# Patient Record
Sex: FEMALE | Race: BLACK OR AFRICAN AMERICAN | NOT HISPANIC OR LATINO
[De-identification: names, ages, dates, MRNs, and addresses within clinical notes are randomized per-mention and may not be internally consistent; named-entity substitution may affect disease eponyms.]

---

## 2018-01-17 ENCOUNTER — ASOB RESULT (OUTPATIENT)
Age: 28
End: 2018-01-17

## 2018-01-17 ENCOUNTER — APPOINTMENT (OUTPATIENT)
Dept: ANTEPARTUM | Facility: CLINIC | Age: 28
End: 2018-01-17
Payer: COMMERCIAL

## 2018-01-17 PROCEDURE — 76813 OB US NUCHAL MEAS 1 GEST: CPT

## 2018-01-17 PROCEDURE — 36416 COLLJ CAPILLARY BLOOD SPEC: CPT

## 2018-01-17 PROCEDURE — 76801 OB US < 14 WKS SINGLE FETUS: CPT

## 2018-01-26 ENCOUNTER — TRANSCRIPTION ENCOUNTER (OUTPATIENT)
Age: 28
End: 2018-01-26

## 2018-03-08 ENCOUNTER — APPOINTMENT (OUTPATIENT)
Dept: ANTEPARTUM | Facility: HOSPITAL | Age: 28
End: 2018-03-08
Payer: COMMERCIAL

## 2018-03-08 ENCOUNTER — ASOB RESULT (OUTPATIENT)
Age: 28
End: 2018-03-08

## 2018-03-08 PROCEDURE — 76811 OB US DETAILED SNGL FETUS: CPT

## 2018-06-26 ENCOUNTER — ASOB RESULT (OUTPATIENT)
Age: 28
End: 2018-06-26

## 2018-06-26 ENCOUNTER — APPOINTMENT (OUTPATIENT)
Dept: ANTEPARTUM | Facility: CLINIC | Age: 28
End: 2018-06-26
Payer: COMMERCIAL

## 2018-06-26 PROCEDURE — 76816 OB US FOLLOW-UP PER FETUS: CPT

## 2018-06-26 PROCEDURE — 76819 FETAL BIOPHYS PROFIL W/O NST: CPT

## 2018-07-06 ENCOUNTER — TRANSCRIPTION ENCOUNTER (OUTPATIENT)
Age: 28
End: 2018-07-06

## 2018-07-07 ENCOUNTER — RESULT REVIEW (OUTPATIENT)
Age: 28
End: 2018-07-07

## 2018-07-07 ENCOUNTER — INPATIENT (INPATIENT)
Facility: HOSPITAL | Age: 28
LOS: 2 days | Discharge: HOME | End: 2018-07-10
Attending: OBSTETRICS & GYNECOLOGY | Admitting: OBSTETRICS & GYNECOLOGY
Payer: COMMERCIAL

## 2018-07-07 VITALS — OXYGEN SATURATION: 100 % | TEMPERATURE: 98 F | SYSTOLIC BLOOD PRESSURE: 114 MMHG | DIASTOLIC BLOOD PRESSURE: 45 MMHG

## 2018-07-07 DIAGNOSIS — Z3A.00 WEEKS OF GESTATION OF PREGNANCY NOT SPECIFIED: ICD-10-CM

## 2018-07-07 DIAGNOSIS — O42.10 PREMATURE RUPTURE OF MEMBRANES, ONSET OF LABOR MORE THAN 24 HOURS FOLLOWING RUPTURE, UNSPECIFIED WEEKS OF GESTATION: ICD-10-CM

## 2018-07-07 DIAGNOSIS — O26.899 OTHER SPECIFIED PREGNANCY RELATED CONDITIONS, UNSPECIFIED TRIMESTER: ICD-10-CM

## 2018-07-07 LAB
BASOPHILS # BLD AUTO: 0.01 K/UL — SIGNIFICANT CHANGE UP (ref 0–0.2)
BASOPHILS NFR BLD AUTO: 0.1 % — SIGNIFICANT CHANGE UP (ref 0–2)
BLD GP AB SCN SERPL QL: NEGATIVE — SIGNIFICANT CHANGE UP
EOSINOPHIL # BLD AUTO: 0.02 K/UL — SIGNIFICANT CHANGE UP (ref 0–0.5)
EOSINOPHIL NFR BLD AUTO: 0.3 % — SIGNIFICANT CHANGE UP (ref 0–6)
HCT VFR BLD CALC: 33.7 % — LOW (ref 34.5–45)
HGB BLD-MCNC: 10.6 G/DL — LOW (ref 11.5–15.5)
IMM GRANULOCYTES # BLD AUTO: 0.05 # — SIGNIFICANT CHANGE UP
IMM GRANULOCYTES NFR BLD AUTO: 0.6 % — SIGNIFICANT CHANGE UP (ref 0–1.5)
LYMPHOCYTES # BLD AUTO: 2.25 K/UL — SIGNIFICANT CHANGE UP (ref 1–3.3)
LYMPHOCYTES # BLD AUTO: 28.8 % — SIGNIFICANT CHANGE UP (ref 13–44)
MCHC RBC-ENTMCNC: 28.2 PG — SIGNIFICANT CHANGE UP (ref 27–34)
MCHC RBC-ENTMCNC: 31.5 % — LOW (ref 32–36)
MCV RBC AUTO: 89.6 FL — SIGNIFICANT CHANGE UP (ref 80–100)
MONOCYTES # BLD AUTO: 0.64 K/UL — SIGNIFICANT CHANGE UP (ref 0–0.9)
MONOCYTES NFR BLD AUTO: 8.2 % — SIGNIFICANT CHANGE UP (ref 2–14)
NEUTROPHILS # BLD AUTO: 4.85 K/UL — SIGNIFICANT CHANGE UP (ref 1.8–7.4)
NEUTROPHILS NFR BLD AUTO: 62 % — SIGNIFICANT CHANGE UP (ref 43–77)
NRBC # FLD: 0 — SIGNIFICANT CHANGE UP
PLATELET # BLD AUTO: 141 K/UL — LOW (ref 150–400)
PMV BLD: 12.1 FL — SIGNIFICANT CHANGE UP (ref 7–13)
RBC # BLD: 3.76 M/UL — LOW (ref 3.8–5.2)
RBC # FLD: 14.4 % — SIGNIFICANT CHANGE UP (ref 10.3–14.5)
RH IG SCN BLD-IMP: POSITIVE — SIGNIFICANT CHANGE UP
RH IG SCN BLD-IMP: POSITIVE — SIGNIFICANT CHANGE UP
WBC # BLD: 7.82 K/UL — SIGNIFICANT CHANGE UP (ref 3.8–10.5)
WBC # FLD AUTO: 7.82 K/UL — SIGNIFICANT CHANGE UP (ref 3.8–10.5)

## 2018-07-07 PROCEDURE — 88307 TISSUE EXAM BY PATHOLOGIST: CPT | Mod: 26

## 2018-07-07 RX ORDER — OXYCODONE HYDROCHLORIDE 5 MG/1
5 TABLET ORAL
Qty: 0 | Refills: 0 | Status: DISCONTINUED | OUTPATIENT
Start: 2018-07-07 | End: 2018-07-09

## 2018-07-07 RX ORDER — OXYCODONE HYDROCHLORIDE 5 MG/1
5 TABLET ORAL EVERY 4 HOURS
Qty: 0 | Refills: 0 | Status: DISCONTINUED | OUTPATIENT
Start: 2018-07-07 | End: 2018-07-10

## 2018-07-07 RX ORDER — SODIUM CHLORIDE 9 MG/ML
1000 INJECTION, SOLUTION INTRAVENOUS ONCE
Qty: 0 | Refills: 0 | Status: DISCONTINUED | OUTPATIENT
Start: 2018-07-07 | End: 2018-07-07

## 2018-07-07 RX ORDER — IBUPROFEN 200 MG
600 TABLET ORAL EVERY 6 HOURS
Qty: 0 | Refills: 0 | Status: COMPLETED | OUTPATIENT
Start: 2018-07-07 | End: 2019-06-05

## 2018-07-07 RX ORDER — OXYCODONE HYDROCHLORIDE 5 MG/1
10 TABLET ORAL
Qty: 0 | Refills: 0 | Status: DISCONTINUED | OUTPATIENT
Start: 2018-07-07 | End: 2018-07-09

## 2018-07-07 RX ORDER — HEPARIN SODIUM 5000 [USP'U]/ML
5000 INJECTION INTRAVENOUS; SUBCUTANEOUS EVERY 12 HOURS
Qty: 0 | Refills: 0 | Status: DISCONTINUED | OUTPATIENT
Start: 2018-07-07 | End: 2018-07-10

## 2018-07-07 RX ORDER — SODIUM CHLORIDE 9 MG/ML
1000 INJECTION, SOLUTION INTRAVENOUS
Qty: 0 | Refills: 0 | Status: DISCONTINUED | OUTPATIENT
Start: 2018-07-07 | End: 2018-07-09

## 2018-07-07 RX ORDER — GLYCERIN ADULT
1 SUPPOSITORY, RECTAL RECTAL AT BEDTIME
Qty: 0 | Refills: 0 | Status: DISCONTINUED | OUTPATIENT
Start: 2018-07-07 | End: 2018-07-10

## 2018-07-07 RX ORDER — FAMOTIDINE 10 MG/ML
20 INJECTION INTRAVENOUS ONCE
Qty: 0 | Refills: 0 | Status: DISCONTINUED | OUTPATIENT
Start: 2018-07-07 | End: 2018-07-07

## 2018-07-07 RX ORDER — ONDANSETRON 8 MG/1
4 TABLET, FILM COATED ORAL EVERY 6 HOURS
Qty: 0 | Refills: 0 | Status: DISCONTINUED | OUTPATIENT
Start: 2018-07-07 | End: 2018-07-09

## 2018-07-07 RX ORDER — DOCUSATE SODIUM 100 MG
100 CAPSULE ORAL
Qty: 0 | Refills: 0 | Status: DISCONTINUED | OUTPATIENT
Start: 2018-07-07 | End: 2018-07-10

## 2018-07-07 RX ORDER — SODIUM CHLORIDE 9 MG/ML
1000 INJECTION, SOLUTION INTRAVENOUS
Qty: 0 | Refills: 0 | Status: DISCONTINUED | OUTPATIENT
Start: 2018-07-07 | End: 2018-07-07

## 2018-07-07 RX ORDER — NALOXONE HYDROCHLORIDE 4 MG/.1ML
0.1 SPRAY NASAL
Qty: 0 | Refills: 0 | Status: DISCONTINUED | OUTPATIENT
Start: 2018-07-07 | End: 2018-07-09

## 2018-07-07 RX ORDER — METOCLOPRAMIDE HCL 10 MG
10 TABLET ORAL ONCE
Qty: 0 | Refills: 0 | Status: DISCONTINUED | OUTPATIENT
Start: 2018-07-07 | End: 2018-07-07

## 2018-07-07 RX ORDER — KETOROLAC TROMETHAMINE 30 MG/ML
30 SYRINGE (ML) INJECTION EVERY 6 HOURS
Qty: 0 | Refills: 0 | Status: DISCONTINUED | OUTPATIENT
Start: 2018-07-07 | End: 2018-07-08

## 2018-07-07 RX ORDER — FERROUS SULFATE 325(65) MG
325 TABLET ORAL DAILY
Qty: 0 | Refills: 0 | Status: DISCONTINUED | OUTPATIENT
Start: 2018-07-07 | End: 2018-07-10

## 2018-07-07 RX ORDER — TETANUS TOXOID, REDUCED DIPHTHERIA TOXOID AND ACELLULAR PERTUSSIS VACCINE, ADSORBED 5; 2.5; 8; 8; 2.5 [IU]/.5ML; [IU]/.5ML; UG/.5ML; UG/.5ML; UG/.5ML
0.5 SUSPENSION INTRAMUSCULAR ONCE
Qty: 0 | Refills: 0 | Status: DISCONTINUED | OUTPATIENT
Start: 2018-07-07 | End: 2018-07-10

## 2018-07-07 RX ORDER — LANOLIN
1 OINTMENT (GRAM) TOPICAL
Qty: 0 | Refills: 0 | Status: DISCONTINUED | OUTPATIENT
Start: 2018-07-07 | End: 2018-07-10

## 2018-07-07 RX ORDER — DIPHENHYDRAMINE HCL 50 MG
25 CAPSULE ORAL EVERY 6 HOURS
Qty: 0 | Refills: 0 | Status: DISCONTINUED | OUTPATIENT
Start: 2018-07-07 | End: 2018-07-10

## 2018-07-07 RX ORDER — HYDROMORPHONE HYDROCHLORIDE 2 MG/ML
1 INJECTION INTRAMUSCULAR; INTRAVENOUS; SUBCUTANEOUS
Qty: 0 | Refills: 0 | Status: DISCONTINUED | OUTPATIENT
Start: 2018-07-07 | End: 2018-07-09

## 2018-07-07 RX ORDER — OXYCODONE HYDROCHLORIDE 5 MG/1
5 TABLET ORAL
Qty: 0 | Refills: 0 | Status: DISCONTINUED | OUTPATIENT
Start: 2018-07-07 | End: 2018-07-10

## 2018-07-07 RX ORDER — CITRIC ACID/SODIUM CITRATE 300-500 MG
30 SOLUTION, ORAL ORAL ONCE
Qty: 0 | Refills: 0 | Status: DISCONTINUED | OUTPATIENT
Start: 2018-07-07 | End: 2018-07-07

## 2018-07-07 RX ORDER — OXYTOCIN 10 UNIT/ML
333.33 VIAL (ML) INJECTION
Qty: 20 | Refills: 0 | Status: DISCONTINUED | OUTPATIENT
Start: 2018-07-07 | End: 2018-07-07

## 2018-07-07 RX ORDER — SIMETHICONE 80 MG/1
80 TABLET, CHEWABLE ORAL EVERY 4 HOURS
Qty: 0 | Refills: 0 | Status: DISCONTINUED | OUTPATIENT
Start: 2018-07-07 | End: 2018-07-10

## 2018-07-07 RX ORDER — OXYTOCIN 10 UNIT/ML
41.67 VIAL (ML) INJECTION
Qty: 20 | Refills: 0 | Status: DISCONTINUED | OUTPATIENT
Start: 2018-07-07 | End: 2018-07-09

## 2018-07-07 RX ORDER — ACETAMINOPHEN 500 MG
975 TABLET ORAL EVERY 6 HOURS
Qty: 0 | Refills: 0 | Status: DISCONTINUED | OUTPATIENT
Start: 2018-07-07 | End: 2018-07-10

## 2018-07-07 RX ADMIN — FAMOTIDINE 20 MILLIGRAM(S): 10 INJECTION INTRAVENOUS at 21:47

## 2018-07-07 RX ADMIN — SODIUM CHLORIDE 1000 MILLILITER(S): 9 INJECTION, SOLUTION INTRAVENOUS at 23:05

## 2018-07-07 RX ADMIN — Medication 125 MILLIUNIT(S)/MIN: at 23:42

## 2018-07-07 RX ADMIN — SODIUM CHLORIDE 125 MILLILITER(S): 9 INJECTION, SOLUTION INTRAVENOUS at 10:14

## 2018-07-07 RX ADMIN — Medication 10 MILLIGRAM(S): at 21:47

## 2018-07-08 ENCOUNTER — TRANSCRIPTION ENCOUNTER (OUTPATIENT)
Age: 28
End: 2018-07-08

## 2018-07-08 LAB
HCT VFR BLD CALC: 31 % — LOW (ref 34.5–45)
HGB BLD-MCNC: 9.8 G/DL — LOW (ref 11.5–15.5)
MCHC RBC-ENTMCNC: 27.7 PG — SIGNIFICANT CHANGE UP (ref 27–34)
MCHC RBC-ENTMCNC: 31.6 % — LOW (ref 32–36)
MCV RBC AUTO: 87.6 FL — SIGNIFICANT CHANGE UP (ref 80–100)
NRBC # FLD: 0 — SIGNIFICANT CHANGE UP
PLATELET # BLD AUTO: 163 K/UL — SIGNIFICANT CHANGE UP (ref 150–400)
PMV BLD: 11.6 FL — SIGNIFICANT CHANGE UP (ref 7–13)
RBC # BLD: 3.54 M/UL — LOW (ref 3.8–5.2)
RBC # FLD: 14.5 % — SIGNIFICANT CHANGE UP (ref 10.3–14.5)
T PALLIDUM AB TITR SER: NEGATIVE — SIGNIFICANT CHANGE UP
WBC # BLD: 11.86 K/UL — HIGH (ref 3.8–10.5)
WBC # FLD AUTO: 11.86 K/UL — HIGH (ref 3.8–10.5)

## 2018-07-08 RX ORDER — SODIUM CHLORIDE 9 MG/ML
1000 INJECTION, SOLUTION INTRAVENOUS ONCE
Qty: 0 | Refills: 0 | Status: COMPLETED | OUTPATIENT
Start: 2018-07-08 | End: 2018-07-07

## 2018-07-08 RX ORDER — IBUPROFEN 200 MG
600 TABLET ORAL EVERY 6 HOURS
Qty: 0 | Refills: 0 | Status: DISCONTINUED | OUTPATIENT
Start: 2018-07-08 | End: 2018-07-10

## 2018-07-08 RX ORDER — SODIUM CHLORIDE 9 MG/ML
1000 INJECTION, SOLUTION INTRAVENOUS
Qty: 0 | Refills: 0 | Status: DISCONTINUED | OUTPATIENT
Start: 2018-07-08 | End: 2018-07-10

## 2018-07-08 RX ORDER — SODIUM CHLORIDE 9 MG/ML
500 INJECTION, SOLUTION INTRAVENOUS ONCE
Qty: 0 | Refills: 0 | Status: DISCONTINUED | OUTPATIENT
Start: 2018-07-08 | End: 2018-07-10

## 2018-07-08 RX ADMIN — SODIUM CHLORIDE 75 MILLILITER(S): 9 INJECTION, SOLUTION INTRAVENOUS at 01:06

## 2018-07-08 RX ADMIN — Medication 30 MILLIGRAM(S): at 18:56

## 2018-07-08 RX ADMIN — Medication 30 MILLIGRAM(S): at 18:26

## 2018-07-08 RX ADMIN — HEPARIN SODIUM 5000 UNIT(S): 5000 INJECTION INTRAVENOUS; SUBCUTANEOUS at 05:28

## 2018-07-08 RX ADMIN — Medication 1 TABLET(S): at 13:33

## 2018-07-08 RX ADMIN — Medication 25 MILLIGRAM(S): at 07:34

## 2018-07-08 RX ADMIN — Medication 325 MILLIGRAM(S): at 13:33

## 2018-07-08 RX ADMIN — HEPARIN SODIUM 5000 UNIT(S): 5000 INJECTION INTRAVENOUS; SUBCUTANEOUS at 18:26

## 2018-07-08 RX ADMIN — Medication 30 MILLIGRAM(S): at 05:28

## 2018-07-08 NOTE — PROGRESS NOTE ADULT - ASSESSMENT
A/P: 28yo POD#1 s/p LTCS.  Patient is stable and doing well post-operatively.    - Continue regular diet as tolerated.  -Sullivan and IV for D/C at 24h  - Ambulation upon D/C of sullivan and IV  - Continue motrin, tylenol, oxycodone PRN for pain control.   - F/u AM CBC    Roxi Rome PGY-1

## 2018-07-08 NOTE — DISCHARGE NOTE OB - MEDICATION SUMMARY - MEDICATIONS TO TAKE
I will START or STAY ON the medications listed below when I get home from the hospital:    ibuprofen 600 mg oral tablet  -- 1 tab(s) by mouth every 6 hours  -- Indication: For Term birth of male     acetaminophen 325 mg oral tablet  -- 3 tab(s) by mouth every 6 hours  -- Indication: For Term birth of male  I will START or STAY ON the medications listed below when I get home from the hospital:    ibuprofen 600 mg oral tablet  -- 1 tab(s) by mouth every 6 hours  -- Indication: For moderate pain    acetaminophen 325 mg oral tablet  -- 3 tab(s) by mouth every 6 hours  -- Indication: For moderate pain

## 2018-07-08 NOTE — PROGRESS NOTE ADULT - SUBJECTIVE AND OBJECTIVE BOX
Patient offers no complaints. Denies shortness of breath, chest pain, palpitations, nausea. Patient had one episode of emesis overnight. Tolerating applesauce. Breast feeding. Lochia moderate.  VS 97.8  P 75  /71 I/O 4600/1215 shift    Heent nl  Abd soft dressing C/DI  Ext b/l Venodynes on  Labs 9.8/31

## 2018-07-08 NOTE — PROGRESS NOTE ADULT - ASSESSMENT
A/P: POD #1 s/p PLTCS, doing well, UOP increasing, hgb decrease appropriate post op  Continue to monitor UOP with sullivan in place  Encouraged increased clear fluid intake

## 2018-07-08 NOTE — PROGRESS NOTE ADULT - SUBJECTIVE AND OBJECTIVE BOX
OB Progress Note:  Delivery, POD#1    S: 26yo POD#1 s/p LTCS . Her pain is well controlled. She has not yet eaten and has not passed flatus. Denies N/V. Denies CP/SOB/lightheadedness/dizziness.   Lozano and IV in place.    O:   Vital Signs Last 24 Hrs  T(C): 36.6 (2018 05:42), Max: 36.8 (2018 02:20)  T(F): 97.8 (2018 05:42), Max: 98.2 (2018 02:20)  HR: 75 (2018 05:42) (75 - 94)  BP: 123/71 (2018 05:42) (114/45 - 139/88)  BP(mean): 73 (2018 01:00) (62 - 103)  RR: 19 (2018 05:42) (15 - 24)  SpO2: 97% (2018 05:42) (97% - 100%)    Labs:  Blood type: A Positive  Rubella IgG: RPR: Negative                          10.6<L>   7.82 >-----------< 141<L>    (  @ 09:30 )             33.7<L>      PE:  General: NAD  Abdomen: Mildly distended, appropriately tender, incision c/d/i.  Extremities: No erythema, no pitting edema  Pelvic: Moderate lochia

## 2018-07-08 NOTE — LACTATION INITIAL EVALUATION - INTERVENTION OUTCOME
continue  to  assess.   spoon  fed  several  drops  of   colostrum  ./needs not met/verbalizes understanding verbalizes understanding/continue  to  assess. rn aware.  spoon  fed  several  drops  of   colostrum  ./needs not met

## 2018-07-08 NOTE — DISCHARGE NOTE OB - CARE PROVIDER_API CALL
Kohanim, Behnam (MD), Obstetrics and Gynecology  [address] [facility]  [address] [place] [address]  Phone: [phone]  Fax: [phone]

## 2018-07-08 NOTE — DISCHARGE NOTE OB - CARE PLAN
Principal Discharge DX:	Term birth of male   Goal:	full recovery  Assessment and plan of treatment:	no driving x 2wks  No heavy lifting x 3months  Pelvic rest x 6wks  Secondary Diagnosis:	Full-term premature rupture of membranes, unspecified duration to onset of labor

## 2018-07-08 NOTE — LACTATION INITIAL EVALUATION - LACTATION INTERVENTIONS
initiate skin to skin/nbn  not  latching   after  several  attempts.  nbn  also   sleepy  reviewed   safe  positioning  with  nose  in  sniffing  position  .  if  nbn  not  breastfeeding  effectively  hand  express  and  pump  and   give  teaspoons  between  feedings alternative  feeding   method.   encouraged  to  ask  for  assistance   .  nbn  less  than  20  hours  old  .  reviewed  hand  expression  and  pumping   frequency  and  care  of  pump/initiate hand expression routine/initiate dual electric pump routine/reverse pressure softening

## 2018-07-08 NOTE — PROGRESS NOTE ADULT - SUBJECTIVE AND OBJECTIVE BOX
Postop Day  __1_ s/p   C- Section     THERAPY:  [  ] Spinal morphine   [ x ] Epidural morphine   [  ] IV PCA Hydromorphone 1 mg/ml      Sedation Score:	  [ x ] Alert	    [  ] Drowsy        [  ] Arousable	[  ] Asleep	[  ] Unresponsive    Side Effects:	  [ x ] None	     [  ] Nausea        [  ] Pruritus        [  ] Weakness   [  ] Numbness        ASSESSMENT/ PLAN   [   ] Discontinue         [  ] Continue  [ x ]Documentation and Verification of current medications     Comments:

## 2018-07-08 NOTE — DISCHARGE NOTE OB - MATERIALS PROVIDED
Back To Sleep Handout/MMR Vaccination (VIS Pub Date: 2012)/Tdap Vaccination (VIS Pub Date: 2012)/  Immunization Record/[facility]  Screening Program/[facility] Hearing Screen Program/Guide to Postpartum Care

## 2018-07-08 NOTE — PROGRESS NOTE ADULT - ASSESSMENT
A: POD#1 s/p Primary cs for category II tracing, decreased urine output s/p fluid bolus  P: Monitor I/O's and Vitals     Encourage ambulation      Routine postop care

## 2018-07-08 NOTE — DISCHARGE NOTE OB - MEDICATION SUMMARY - MEDICATIONS TO STOP TAKING
I will STOP taking the medications listed below when I get home from the hospital:     mg oral tablet  -- 1 tab(s) by mouth every 8 hours as needed for pain with food  -- Do not take this drug if you are pregnant.  It is very important that you take or use this exactly as directed.  Do not skip doses or discontinue unless directed by your doctor.  May cause drowsiness or dizziness.  Obtain medical advice before taking any non-prescription drugs as some may affect the action of this medication.  Take with food or milk. I will STOP taking the medications listed below when I get home from the hospital:     mg oral tablet  -- 1 tab(s) by mouth every 8 hours as needed for pain with food  -- Do not take this drug if you are pregnant.  It is very important that you take or use this exactly as directed.  Do not skip doses or discontinue unless directed by your doctor.  May cause drowsiness or dizziness.  Obtain medical advice before taking any non-prescription drugs as some may affect the action of this medication.  Take with food or milk.    Valium 5 mg oral tablet  -- 1 tab(s) by mouth every 8 hours as needed for muscle spasm  -- Caution federal law prohibits the transfer of this drug to any person other  than the person for whom it was prescribed.  Do not take this drug if you are pregnant.  May cause drowsiness.  Alcohol may intensify this effect.  Use care when operating dangerous machinery.

## 2018-07-08 NOTE — DISCHARGE NOTE OB - PATIENT PORTAL LINK FT
You can access the [facility] Patient Portal, offered by [facility], by registering with the following website: [email or web address]

## 2018-07-08 NOTE — PROGRESS NOTE ADULT - SUBJECTIVE AND OBJECTIVE BOX
OB/GYN House Attending      Patient feeling well, pain controlled, marlys gen diet, drank two pitchers of fluid  No dizziness  RN noted UOP 85 ml/3 hours this morning, plan was for 500ml bolus at noon if it had not increased    She is normotensive with HR in the 70s-90s, afebrile  Gen: Alert, awake, NAD  CV: RRR  Lungs: Clear  Abd: soft, min distension, +bowel sounds, minimal tenderness to palpation of abdomen and uterus  Ext: 2+ edema, non tender, symmetric    Lozano catheter in place, average urine output now about 40ml/hour                          9.8    11.86 )-----------( 163      ( 08 Jul 2018 08:25 )             31.0     Prior Hgb was 10.6

## 2018-07-08 NOTE — PROVIDER CONTACT NOTE (OTHER) - ACTION/TREATMENT ORDERED:
Dr. Quiroz in to see pt. Per Dr. Quiroz if continues to have decreased UOP give 500cc bolus of LR at 12p

## 2018-07-08 NOTE — PROVIDER CONTACT NOTE (OTHER) - BACKGROUND
primary C/S 7/7 2219, with decrease UOP since last night. 500cc IVF bolus given as ordered H/H 9.8/31 from this am.

## 2018-07-09 RX ORDER — IBUPROFEN 200 MG
1 TABLET ORAL
Qty: 0 | Refills: 0 | DISCHARGE
Start: 2018-07-09

## 2018-07-09 RX ORDER — ACETAMINOPHEN 500 MG
3 TABLET ORAL
Qty: 0 | Refills: 0 | DISCHARGE
Start: 2018-07-09

## 2018-07-09 RX ADMIN — HEPARIN SODIUM 5000 UNIT(S): 5000 INJECTION INTRAVENOUS; SUBCUTANEOUS at 06:31

## 2018-07-09 RX ADMIN — Medication 600 MILLIGRAM(S): at 18:30

## 2018-07-09 RX ADMIN — Medication 600 MILLIGRAM(S): at 12:15

## 2018-07-09 RX ADMIN — Medication 600 MILLIGRAM(S): at 17:48

## 2018-07-09 RX ADMIN — Medication 975 MILLIGRAM(S): at 17:47

## 2018-07-09 RX ADMIN — Medication 325 MILLIGRAM(S): at 11:27

## 2018-07-09 RX ADMIN — Medication 600 MILLIGRAM(S): at 03:48

## 2018-07-09 RX ADMIN — Medication 975 MILLIGRAM(S): at 11:28

## 2018-07-09 RX ADMIN — Medication 1 TABLET(S): at 11:27

## 2018-07-09 RX ADMIN — HEPARIN SODIUM 5000 UNIT(S): 5000 INJECTION INTRAVENOUS; SUBCUTANEOUS at 17:10

## 2018-07-09 RX ADMIN — Medication 975 MILLIGRAM(S): at 12:15

## 2018-07-09 RX ADMIN — Medication 600 MILLIGRAM(S): at 11:27

## 2018-07-09 RX ADMIN — Medication 600 MILLIGRAM(S): at 02:37

## 2018-07-09 RX ADMIN — Medication 975 MILLIGRAM(S): at 18:30

## 2018-07-09 NOTE — PROGRESS NOTE ADULT - SUBJECTIVE AND OBJECTIVE BOX
Postpartum Note,  Section  She is a  27y woman who is now post-operative day: 2    Subjective:  The patient feels well.  She is ambulating.   She is tolerating regular diet.  She denies nausea and vomiting.  She is voiding.  Her pain is controlled.  She reports normal postpartum bleeding.    Physical exam:  Vital Signs Last 24 Hrs  T(C): 37.1 (2018 17:31), Max: 37.1 (2018 06:28)  T(F): 98.8 (2018 17:31), Max: 98.8 (2018 17:31)  HR: 78 (2018 17:31) (75 - 90)  BP: 139/74 (2018 17:31) (129/71 - 139/74)  BP(mean): --  RR: 18 (2018 17:31) (17 - 18)  SpO2: 100% (2018 17:31) (97% - 100%)  Gen: NAD      LABS:                        9.8    11.86 )-----------( 163      ( 2018 08:25 )             31.0       Allergies    No Known Allergies    Intolerances      MEDICATIONS  (STANDING):  acetaminophen   Tablet. 975 milliGRAM(s) Oral every 6 hours  diphtheria/tetanus/pertussis (acellular) Vaccine (ADAcel) 0.5 milliLiter(s) IntraMuscular once  ferrous    sulfate 325 milliGRAM(s) Oral daily  heparin  Injectable 5000 Unit(s) SubCutaneous every 12 hours  ibuprofen  Tablet 600 milliGRAM(s) Oral every 6 hours  lactated ringers Bolus 500 milliLiter(s) IV Bolus once  lactated ringers. 1000 milliLiter(s) (75 mL/Hr) IV Continuous <Continuous>  oxyCODONE    IR 5 milliGRAM(s) Oral every 3 hours  prenatal multivitamin 1 Tablet(s) Oral daily    MEDICATIONS  (PRN):  diphenhydrAMINE   Capsule 25 milliGRAM(s) Oral every 6 hours PRN Itching  docusate sodium 100 milliGRAM(s) Oral two times a day PRN Stool Softening  glycerin Suppository - Adult 1 Suppository(s) Rectal at bedtime PRN Constipation  lanolin Ointment 1 Application(s) Topical every 3 hours PRN Sore Nipples  oxyCODONE    IR 5 milliGRAM(s) Oral every 4 hours PRN Severe Pain (7 - 10)  simethicone 80 milliGRAM(s) Chew every 4 hours PRN Gas      Assessment and Plan  POD #2 s/p  section  Doing well.  Encourage ambulation.  DC HOME IN AM

## 2018-07-10 VITALS
SYSTOLIC BLOOD PRESSURE: 134 MMHG | RESPIRATION RATE: 18 BRPM | DIASTOLIC BLOOD PRESSURE: 66 MMHG | TEMPERATURE: 97 F | OXYGEN SATURATION: 100 % | HEART RATE: 71 BPM

## 2018-07-10 RX ADMIN — Medication 600 MILLIGRAM(S): at 06:51

## 2018-07-10 RX ADMIN — Medication 975 MILLIGRAM(S): at 06:51

## 2018-07-10 RX ADMIN — Medication 975 MILLIGRAM(S): at 00:34

## 2018-07-10 RX ADMIN — Medication 600 MILLIGRAM(S): at 01:10

## 2018-07-10 RX ADMIN — Medication 975 MILLIGRAM(S): at 12:23

## 2018-07-10 RX ADMIN — Medication 600 MILLIGRAM(S): at 00:34

## 2018-07-10 RX ADMIN — Medication 600 MILLIGRAM(S): at 12:45

## 2018-07-10 RX ADMIN — Medication 975 MILLIGRAM(S): at 12:44

## 2018-07-10 RX ADMIN — Medication 325 MILLIGRAM(S): at 12:45

## 2018-07-10 RX ADMIN — Medication 975 MILLIGRAM(S): at 01:10

## 2018-07-10 RX ADMIN — Medication 100 MILLIGRAM(S): at 06:51

## 2018-07-10 RX ADMIN — Medication 975 MILLIGRAM(S): at 07:30

## 2018-07-10 RX ADMIN — Medication 600 MILLIGRAM(S): at 12:24

## 2018-07-10 RX ADMIN — HEPARIN SODIUM 5000 UNIT(S): 5000 INJECTION INTRAVENOUS; SUBCUTANEOUS at 06:51

## 2018-07-10 RX ADMIN — Medication 600 MILLIGRAM(S): at 07:30

## 2018-07-10 RX ADMIN — Medication 1 TABLET(S): at 12:45

## 2018-07-10 NOTE — PROGRESS NOTE ADULT - SUBJECTIVE AND OBJECTIVE BOX
SUBJECTIVE:    Pain: Controlled    Complaints: None    MILESTONES:    Alert and Oriented x 3  [ x ]  Out of bed/ ambulating. [ x ]  Flatus:   Positive [ x ]  Negative [  ]  Bowel movement  [  ] Positive [  ] Negative   Voiding [x  ] Due to void [  ]   Lozano/Indwelling catheter in place [  ]  Diet: Regular [ x ]  Clears [  ]  NPO [  ]    Infant feeding:  Breast [  ]   Bottle [  ]  Both [ X ]  Feeding related issues and/or concerns:      OBJECTIVE:  T(C): 36.3 (07-10-18 @ 05:04), Max: 37.1 (18 @ 17:31)  HR: 71 (07-10-18 @ 05:04) (71 - 78)  BP: 134/66 (07-10-18 @ 05:04) (127/67 - 139/74)  RR: 18 (07-10-18 @ 05:04) (17 - 18)  SpO2: 100% (07-10-18 @ 05:04) (100% - 100%)  Wt(kg): --          Blood Type: A Positive    RPR: Negative          MEDICATIONS  (STANDING):  acetaminophen   Tablet. 975 milliGRAM(s) Oral every 6 hours  diphtheria/tetanus/pertussis (acellular) Vaccine (ADAcel) 0.5 milliLiter(s) IntraMuscular once  ferrous    sulfate 325 milliGRAM(s) Oral daily  heparin  Injectable 5000 Unit(s) SubCutaneous every 12 hours  ibuprofen  Tablet 600 milliGRAM(s) Oral every 6 hours  lactated ringers Bolus 500 milliLiter(s) IV Bolus once  oxyCODONE    IR 5 milliGRAM(s) Oral every 3 hours  prenatal multivitamin 1 Tablet(s) Oral daily    MEDICATIONS  (PRN):  diphenhydrAMINE   Capsule 25 milliGRAM(s) Oral every 6 hours PRN Itching  docusate sodium 100 milliGRAM(s) Oral two times a day PRN Stool Softening  glycerin Suppository - Adult 1 Suppository(s) Rectal at bedtime PRN Constipation  lanolin Ointment 1 Application(s) Topical every 3 hours PRN Sore Nipples  oxyCODONE    IR 5 milliGRAM(s) Oral every 4 hours PRN Severe Pain (7 - 10)  simethicone 80 milliGRAM(s) Chew every 4 hours PRN Gas        ASSESSMENT:    27y     G   1   P  1001       PO Day#  3        Delivery: Primary [ X ]    Repeat [  ]                                         Indication of procedure: Abnormal Fetal Status    Condition: Stable    Past Medical History significant for: HPI:      Current Issues:    Breasts:  Soft [x  ]   Engorged [  ]  Nipples:  Abdomen: Soft [ x ]   Distended [  ] Nontender [  ]     Bowel sounds :  Present [  ]  Absent [  ]   Fundus:  Firm [x  ]  Boggy [  ]    Abdominal incision: Clean, Dry and Intact [x  ]  Staples [  ] Steri Strips [ X ] Dermabond [  ] Sutures [  ]   Dressing Removed today.    Patient wearing abdominal binder for support.    Vaginal: Lochia:  Heavy [  ]  Moderate [ x ]   Scant [  ]    Extremities: Edema [  ] Negative Patricia's Sign [  ] Nontender Sammy  [ x ] Positive pedal pulses [  ]    Other relevant physical exam findings:      PLAN:    Plan: Increase ambulation, analgesia PRN and pain medication protocol standing oxycodone, ibuprofen and acetaminophen.    Diet: Regular diet    Continue routine post-operative and postpartum care. Dressing Removed    Discharge Planning [ x ]    For discharge Today  [ X   ]    Consults:  Social Work [  ]  Lactation [ x ]  Other [         ]

## 2018-07-20 LAB — SURGICAL PATHOLOGY STUDY: SIGNIFICANT CHANGE UP

## 2019-07-31 ENCOUNTER — RESULT REVIEW (OUTPATIENT)
Age: 29
End: 2019-07-31

## 2020-04-26 ENCOUNTER — EMERGENCY (EMERGENCY)
Facility: HOSPITAL | Age: 30
LOS: 1 days | Discharge: HOME | End: 2020-04-26
Attending: EMERGENCY MEDICINE | Admitting: EMERGENCY MEDICINE
Payer: COMMERCIAL

## 2020-04-26 VITALS
RESPIRATION RATE: 16 BRPM | TEMPERATURE: 99 F | HEART RATE: 111 BPM | OXYGEN SATURATION: 100 % | SYSTOLIC BLOOD PRESSURE: 119 MMHG | DIASTOLIC BLOOD PRESSURE: 79 MMHG

## 2020-04-26 VITALS
DIASTOLIC BLOOD PRESSURE: 56 MMHG | TEMPERATURE: 98 F | OXYGEN SATURATION: 100 % | RESPIRATION RATE: 16 BRPM | SYSTOLIC BLOOD PRESSURE: 129 MMHG | HEART RATE: 82 BPM

## 2020-04-26 LAB
ALBUMIN SERPL ELPH-MCNC: 3.9 G/DL — SIGNIFICANT CHANGE UP (ref 3.3–5)
ALP SERPL-CCNC: 57 U/L — SIGNIFICANT CHANGE UP (ref 40–120)
ALT FLD-CCNC: 13 U/L — SIGNIFICANT CHANGE UP (ref 4–33)
ANION GAP SERPL CALC-SCNC: 13 MMO/L — SIGNIFICANT CHANGE UP (ref 7–14)
APTT BLD: 26.2 SEC — LOW (ref 27.5–36.3)
AST SERPL-CCNC: 13 U/L — SIGNIFICANT CHANGE UP (ref 4–32)
BASOPHILS # BLD AUTO: 0.03 K/UL — SIGNIFICANT CHANGE UP (ref 0–0.2)
BASOPHILS NFR BLD AUTO: 0.3 % — SIGNIFICANT CHANGE UP (ref 0–2)
BILIRUB SERPL-MCNC: < 0.2 MG/DL — LOW (ref 0.2–1.2)
BLD GP AB SCN SERPL QL: NEGATIVE — SIGNIFICANT CHANGE UP
BUN SERPL-MCNC: 12 MG/DL — SIGNIFICANT CHANGE UP (ref 7–23)
CALCIUM SERPL-MCNC: 8.8 MG/DL — SIGNIFICANT CHANGE UP (ref 8.4–10.5)
CHLORIDE SERPL-SCNC: 103 MMOL/L — SIGNIFICANT CHANGE UP (ref 98–107)
CO2 SERPL-SCNC: 25 MMOL/L — SIGNIFICANT CHANGE UP (ref 22–31)
CREAT SERPL-MCNC: 0.64 MG/DL — SIGNIFICANT CHANGE UP (ref 0.5–1.3)
EOSINOPHIL # BLD AUTO: 0.04 K/UL — SIGNIFICANT CHANGE UP (ref 0–0.5)
EOSINOPHIL NFR BLD AUTO: 0.4 % — SIGNIFICANT CHANGE UP (ref 0–6)
GLUCOSE SERPL-MCNC: 122 MG/DL — HIGH (ref 70–99)
HCG SERPL-ACNC: 2624 MIU/ML — SIGNIFICANT CHANGE UP
HCT VFR BLD CALC: 28.1 % — LOW (ref 34.5–45)
HGB BLD-MCNC: 9 G/DL — LOW (ref 11.5–15.5)
IMM GRANULOCYTES NFR BLD AUTO: 0.4 % — SIGNIFICANT CHANGE UP (ref 0–1.5)
INR BLD: 1.03 — SIGNIFICANT CHANGE UP (ref 0.88–1.17)
LYMPHOCYTES # BLD AUTO: 2.24 K/UL — SIGNIFICANT CHANGE UP (ref 1–3.3)
LYMPHOCYTES # BLD AUTO: 23.9 % — SIGNIFICANT CHANGE UP (ref 13–44)
MCHC RBC-ENTMCNC: 27 PG — SIGNIFICANT CHANGE UP (ref 27–34)
MCHC RBC-ENTMCNC: 32 % — SIGNIFICANT CHANGE UP (ref 32–36)
MCV RBC AUTO: 84.4 FL — SIGNIFICANT CHANGE UP (ref 80–100)
MONOCYTES # BLD AUTO: 0.46 K/UL — SIGNIFICANT CHANGE UP (ref 0–0.9)
MONOCYTES NFR BLD AUTO: 4.9 % — SIGNIFICANT CHANGE UP (ref 2–14)
NEUTROPHILS # BLD AUTO: 6.57 K/UL — SIGNIFICANT CHANGE UP (ref 1.8–7.4)
NEUTROPHILS NFR BLD AUTO: 70.1 % — SIGNIFICANT CHANGE UP (ref 43–77)
NRBC # FLD: 0 K/UL — SIGNIFICANT CHANGE UP (ref 0–0)
PLATELET # BLD AUTO: 266 K/UL — SIGNIFICANT CHANGE UP (ref 150–400)
PMV BLD: 9.9 FL — SIGNIFICANT CHANGE UP (ref 7–13)
POTASSIUM SERPL-MCNC: 3.9 MMOL/L — SIGNIFICANT CHANGE UP (ref 3.5–5.3)
POTASSIUM SERPL-SCNC: 3.9 MMOL/L — SIGNIFICANT CHANGE UP (ref 3.5–5.3)
PROT SERPL-MCNC: 7.1 G/DL — SIGNIFICANT CHANGE UP (ref 6–8.3)
PROTHROM AB SERPL-ACNC: 11.8 SEC — SIGNIFICANT CHANGE UP (ref 9.8–13.1)
RBC # BLD: 3.33 M/UL — LOW (ref 3.8–5.2)
RBC # FLD: 13.9 % — SIGNIFICANT CHANGE UP (ref 10.3–14.5)
RH IG SCN BLD-IMP: POSITIVE — SIGNIFICANT CHANGE UP
SODIUM SERPL-SCNC: 141 MMOL/L — SIGNIFICANT CHANGE UP (ref 135–145)
WBC # BLD: 9.38 K/UL — SIGNIFICANT CHANGE UP (ref 3.8–10.5)
WBC # FLD AUTO: 9.38 K/UL — SIGNIFICANT CHANGE UP (ref 3.8–10.5)

## 2020-04-26 PROCEDURE — 76817 TRANSVAGINAL US OBSTETRIC: CPT | Mod: 26

## 2020-04-26 PROCEDURE — 99284 EMERGENCY DEPT VISIT MOD MDM: CPT

## 2020-04-26 RX ORDER — SODIUM CHLORIDE 9 MG/ML
1000 INJECTION INTRAMUSCULAR; INTRAVENOUS; SUBCUTANEOUS ONCE
Refills: 0 | Status: COMPLETED | OUTPATIENT
Start: 2020-04-26 | End: 2020-04-26

## 2020-04-26 RX ORDER — ACETAMINOPHEN 500 MG
975 TABLET ORAL ONCE
Refills: 0 | Status: COMPLETED | OUTPATIENT
Start: 2020-04-26 | End: 2020-04-26

## 2020-04-26 RX ADMIN — SODIUM CHLORIDE 2000 MILLILITER(S): 9 INJECTION INTRAMUSCULAR; INTRAVENOUS; SUBCUTANEOUS at 16:37

## 2020-04-26 RX ADMIN — Medication 975 MILLIGRAM(S): at 17:10

## 2020-04-26 RX ADMIN — SODIUM CHLORIDE 1000 MILLILITER(S): 9 INJECTION INTRAMUSCULAR; INTRAVENOUS; SUBCUTANEOUS at 20:08

## 2020-04-26 NOTE — ED ADULT NURSE NOTE - OBJECTIVE STATEMENT
pt is A&Ox3, ambulatory, able to make needs known and presents with C/O vaginal bleeding with abdominal pain x 3 days. PT reports being DX with a miscarriage thus past Friday and has began passing products of conception on Saturday. Today PT reports soaking 13 pad having increased ab pain. PT's breathing is equal and unlabored. IV obtained to left A/C with blood sent to lab

## 2020-04-26 NOTE — ED PROVIDER NOTE - NSFOLLOWUPINSTRUCTIONS_ED_ALL_ED_FT
Follow up with our OB/GYN clinic in two days to have a repeat beta-HCG level to confirm that the  has completed--at this time there is no longer an intrauterine pregnancy visualized on the ultrasound imaging, however there is still heterogenous fluid likely representative of products of conception (fetal tissue). Please return here to the emergency department for any new symptoms of concern such as severe pain, intractable nausea/vomiting, or other new worries, otherwise please do follow up with our OB/GYN team to arrange for a  2 day repeat hcg and possible ultrasound if they deem necessary.

## 2020-04-26 NOTE — ED PROVIDER NOTE - OBJECTIVE STATEMENT
29 F  12wks pregnant presents with 2 days lower abdominal cramping b/l comes and goes assoc with nausea, no vomiting. Also reports vaginal bleeding x2 days +clots. Soaked through 1 pad in 15 minutes. [facility]. Has tried to reach her ob/gyn but could not reach her. Denies cp, fevers, sob, diarrhea, urinary symptoms.     Ob/Gyn: Dr. Smyth

## 2020-04-26 NOTE — ED PROVIDER NOTE - PATIENT PORTAL LINK FT
You can access the FollowMyHealth Patient Portal offered by [facility] by registering at the following website: [email or web address]. By joining [employer]’s FollowMyHealth portal, you will also be able to view your health information using other applications (apps) compatible with our system.

## 2020-04-26 NOTE — ED PROVIDER NOTE - CLINICAL SUMMARY MEDICAL DECISION MAKING FREE TEXT BOX
29 F  12wks pregnant presents with 2 days lower abdominal cramping b/l comes and goes assoc with nausea, no vomiting. Also reports vaginal bleeding x2 days +clots. No fevers. Pt had US 3 days ago at [facility] showing IUP with no fetal cardiac activity. Pt tachycardic in triage  abd mild TTP b/l lower abdomen. DDx spontaneous  (most likely), also considered ectopic, inevitable , hemorrhagic ovarian cyst. plan labs, HCG, TVUS, EKG

## 2020-04-26 NOTE — ED ADULT NURSE REASSESSMENT NOTE - NS ED NURSE REASSESS COMMENT FT1
received pt at change of shift, pt in no apparent distress, comfortable in appearance, pt was taken to US. pt verbalized she had a miscarriage on Friday, here today for heavy bleeding, nausea, vomiting, pain. pt verbalized pain relief with last medication regimen of tylenol.

## 2020-04-26 NOTE — ED PROVIDER NOTE - PHYSICAL EXAMINATION
Gen: well developed female, NAD   HEENT: NCAT, EOMI, no nasal discharge, mucous membranes dry   CV: RRR, +S1/S2, no M/R/G  Resp: CTAB, no W/R/R  GI: Abdomen soft non-distended, +TTP b/l lower abd no rebound rigidity or guarding   : mod dark blood in vaginal vault +large clots visible, cervical os not visualized due to clots   MSK: No open wounds, no bruising, no LE edema  Neuro: A&Ox4, following commands, moving all four extremities spontaneously  Psych: appropriate mood

## 2020-04-26 NOTE — ED PROVIDER NOTE - NSFOLLOWUPCLINICS_GEN_ALL_ED_FT
[facility]  OB/GYN & Surg  [address] [place] [id number]  Phone: [phone]  Fax:   Follow Up Time: 1-3 Days    [facility] Gynecology and Obstetrics  Gynceology/OB  [address] [place] [address]  Phone: [phone]  Fax:   Follow Up Time: 1-3 Days

## 2020-04-26 NOTE — ED ADULT TRIAGE NOTE - CHIEF COMPLAINT QUOTE
Pt. abdominal cramping. vomiting, vaginal bleeding since yesterday. States she was told yesterday she had a miscarriage. Pt. approx. 12 wks pregnant. No pmhx

## 2020-04-26 NOTE — ED PROVIDER NOTE - PROGRESS NOTE DETAILS
Ted Perez MD. pt signed out pending US to eval for retained products. pt states that she had an US about 3 days ago that did not have a fetal heart rate. pt staets the bleeding has slowed down and that she is not having any pain currently, just some pressure sensation in her suprapubic region. abd is soft, ND, NT. hemodynamically stable. pending US. Ted Perez MD. reviewed pt's previous US report done on 3/30 at [facility], which showed a single live intrauterine gestation at 8 weeks 0 days. The FHR at that time was 165 BPM.   Given this and given today's US results, will have pt follow up with OB/GYN in 2 days. pt displays understanding. return precautions provided. all qusetions answered. Ted Perez MD. reviewed pt's previous US report (via patient's telephone) done on 3/30 at [facility], which showed a single live intrauterine gestation at 8 weeks 0 days. The FHR at that time was 165 BPM.   Given this and given today's US results, low c/f ectopic pregnancy.  Will have pt follow up with OB/GYN in 2 days. pt displays understanding. return precautions provided. all qusetions answered.

## 2020-05-01 ENCOUNTER — APPOINTMENT (OUTPATIENT)
Dept: OBGYN | Facility: CLINIC | Age: 30
End: 2020-05-01

## 2020-05-04 ENCOUNTER — APPOINTMENT (OUTPATIENT)
Dept: OBGYN | Facility: CLINIC | Age: 30
End: 2020-05-04
Payer: COMMERCIAL

## 2020-05-04 ENCOUNTER — TRANSCRIPTION ENCOUNTER (OUTPATIENT)
Age: 30
End: 2020-05-04

## 2020-05-04 VITALS — WEIGHT: 212.13 LBS | DIASTOLIC BLOOD PRESSURE: 77 MMHG | SYSTOLIC BLOOD PRESSURE: 133 MMHG

## 2020-05-04 DIAGNOSIS — Z78.9 OTHER SPECIFIED HEALTH STATUS: ICD-10-CM

## 2020-05-04 DIAGNOSIS — O03.9 COMPLETE OR UNSPECIFIED SPONTANEOUS ABORTION W/OUT COMPLICATION: ICD-10-CM

## 2020-05-04 PROCEDURE — 99202 OFFICE O/P NEW SF 15 MIN: CPT

## 2020-05-04 NOTE — CHIEF COMPLAINT
[Initial Visit] : initial GYN visit [FreeTextEntry1] : LMP 2 with positive pregnancy test with bleeding \par She went to the ER on \par She was found to have a quant of \par She is having very light bleeding today\par She had a sonogram consistent with a spontaneous \par Rh is positive

## 2020-05-05 ENCOUNTER — TRANSCRIPTION ENCOUNTER (OUTPATIENT)
Age: 30
End: 2020-05-05

## 2020-05-05 LAB — HCG SERPL-MCNC: 379 MIU/ML

## 2022-03-16 ENCOUNTER — EMERGENCY (EMERGENCY)
Facility: HOSPITAL | Age: 32
LOS: 0 days | Discharge: HOME | End: 2022-03-16
Payer: COMMERCIAL

## 2022-03-16 VITALS
OXYGEN SATURATION: 100 % | HEART RATE: 73 BPM | DIASTOLIC BLOOD PRESSURE: 85 MMHG | RESPIRATION RATE: 18 BRPM | SYSTOLIC BLOOD PRESSURE: 125 MMHG | TEMPERATURE: 98 F

## 2022-03-16 VITALS
RESPIRATION RATE: 17 BRPM | TEMPERATURE: 98 F | HEART RATE: 85 BPM | HEIGHT: 65 IN | WEIGHT: 184.97 LBS | DIASTOLIC BLOOD PRESSURE: 85 MMHG | OXYGEN SATURATION: 100 % | SYSTOLIC BLOOD PRESSURE: 135 MMHG

## 2022-03-16 DIAGNOSIS — M54.9 DORSALGIA, UNSPECIFIED: ICD-10-CM
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
## 2022-03-16 PROCEDURE — 99283 EMERGENCY DEPT VISIT LOW MDM: CPT

## 2022-03-16 RX ORDER — CYCLOBENZAPRINE HYDROCHLORIDE 10 MG/1
1 TABLET, FILM COATED ORAL
Qty: 21 | Refills: 0
Start: 2022-03-16 | End: 2022-03-22

## 2022-03-16 RX ORDER — IBUPROFEN 200 MG
1 TABLET ORAL
Qty: 21 | Refills: 0
Start: 2022-03-16 | End: 2022-03-22

## 2022-03-16 RX ORDER — LIDOCAINE 4 G/100G
1 CREAM TOPICAL ONCE
Refills: 0 | Status: COMPLETED | OUTPATIENT
Start: 2022-03-16 | End: 2022-03-16

## 2022-03-16 RX ORDER — ACETAMINOPHEN 500 MG
975 TABLET ORAL ONCE
Refills: 0 | Status: COMPLETED | OUTPATIENT
Start: 2022-03-16 | End: 2022-03-16

## 2022-03-16 RX ORDER — ACETAMINOPHEN 500 MG
2 TABLET ORAL
Qty: 42 | Refills: 0
Start: 2022-03-16 | End: 2022-03-22

## 2022-03-16 RX ORDER — LIDOCAINE 4 G/100G
1 CREAM TOPICAL
Qty: 30 | Refills: 0
Start: 2022-03-16 | End: 2022-04-14

## 2022-03-16 RX ADMIN — Medication 500 MILLIGRAM(S): at 11:02

## 2022-03-16 RX ADMIN — LIDOCAINE 1 PATCH: 4 CREAM TOPICAL at 10:51

## 2022-03-16 RX ADMIN — Medication 975 MILLIGRAM(S): at 10:51

## 2022-03-16 NOTE — ED PROVIDER NOTE - PATIENT PORTAL LINK FT
You can access the FollowMyHealth Patient Portal offered by [facility] by registering at the following website: [email or web address]. By joining [employer]’s FollowMyHealth portal, you will also be able to view your health information using other applications (apps) compatible with our system.
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
## 2022-03-16 NOTE — ED PROVIDER NOTE - PHYSICAL EXAMINATION
PE:   GEN: Awake, alert, interactive, NAD, non-toxic appearing.   HEAD AND NECK: NC/AT. Airway patent. Neck supple.   EYES: Clear b/l. PERRL  CARDIAC: RRR. S1, S2. No evident pedal edema.    RESP: Normal respiratory effort with no use of accessory muscles or retractions. Clear throughout on auscultation.  ABD: soft, non-distended, non-tender. No rebound, no guarding.   NEURO: AOx3, CN II-XII grossly intact, no focal deficits. Strength 5/5 in all extremities. Sensation intact.   MSK: No midline tenderness throughout. Diffuse paraspinal tenderness mostly at the trapezius b/l. Moving all extremities with no apparent deformities.   SKIN: Warm, dry, intact normal color
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
## 2022-03-16 NOTE — ED PROVIDER NOTE - NS ED ROS FT
Constitutional: (-) Fever, (-) Anorexia, (-) Generalized Malaise  Eyes: (-)Discharge, (-) Irritation,  (-) Visual changes  EARS: (-) Ear Pain, (-) Apparent hearing changes  NOSE: (-) Congestion, (-) Bloody nose  MOUTH/THROAT: (-) Vocal Changes, (-) Drooling, (-) Sore throat  NECK: (-) Lumps, (-) Stiffness, (-) Pain  CV: (-) Chest Pain, (-) Palpitations, (-) Edema   RESP:  (-) Cough, (-) SOB, (-) SY,  (-) Wheezing  GI: (-) Nausea, (-) Vomiting, (-) Abdominal Pain, (-) Diarrhea, (-) Constipation, (-) Bloody stools  : (-) Dysuria, (-) Frequency, (-) Hematuria, (-) Incontinence  MSK: (-) Joint Pain, (+) Back Pain, (-) Deformities  SKIN: (-) Wounds, (-) Color change, (-)Rash, (-) Swelling  NEURO:(-) Headache, (-) Dizziness, (-) Numbness/Tingling,  (-)LOC
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
## 2022-03-16 NOTE — ED PROVIDER NOTE - CARE PROVIDER_API CALL
Shekhar Ma (DO)  Orthopaedic Surgery  [address] [place] [address]  Phone: [phone]  Fax: [phone]  Follow Up Time:

## 2022-03-16 NOTE — ED ADULT NURSE NOTE - NSIMPLEMENTINTERV_GEN_ALL_ED
Implemented All Universal Safety Interventions:  [place] to call system. Call bell, personal items and telephone within reach. Instruct patient to call for assistance. Room bathroom lighting operational. Non-slip footwear when patient is off stretcher. Physically safe environment: no spills, clutter or unnecessary equipment. Stretcher in lowest position, wheels locked, appropriate side rails in place.

## 2022-12-29 ENCOUNTER — EMERGENCY (EMERGENCY)
Facility: HOSPITAL | Age: 32
LOS: 1 days | Discharge: HOME | End: 2022-12-29
Attending: EMERGENCY MEDICINE | Admitting: EMERGENCY MEDICINE
Payer: COMMERCIAL

## 2022-12-29 VITALS
RESPIRATION RATE: 16 BRPM | SYSTOLIC BLOOD PRESSURE: 130 MMHG | HEART RATE: 96 BPM | DIASTOLIC BLOOD PRESSURE: 70 MMHG | OXYGEN SATURATION: 100 %

## 2022-12-29 VITALS
SYSTOLIC BLOOD PRESSURE: 133 MMHG | RESPIRATION RATE: 16 BRPM | TEMPERATURE: 98 F | DIASTOLIC BLOOD PRESSURE: 78 MMHG | HEART RATE: 109 BPM | OXYGEN SATURATION: 99 %

## 2022-12-29 PROCEDURE — 99284 EMERGENCY DEPT VISIT MOD MDM: CPT

## 2022-12-29 RX ORDER — IBUPROFEN 200 MG
600 TABLET ORAL ONCE
Refills: 0 | Status: COMPLETED | OUTPATIENT
Start: 2022-12-29 | End: 2022-12-29

## 2022-12-29 RX ORDER — LIDOCAINE 4 G/100G
1 CREAM TOPICAL ONCE
Refills: 0 | Status: COMPLETED | OUTPATIENT
Start: 2022-12-29 | End: 2022-12-29

## 2022-12-29 RX ADMIN — LIDOCAINE 1 PATCH: 4 CREAM TOPICAL at 13:29

## 2022-12-29 RX ADMIN — Medication 600 MILLIGRAM(S): at 13:42

## 2022-12-29 NOTE — ED PROVIDER NOTE - NSFOLLOWUPINSTRUCTIONS_ED_ALL_ED_FT
Follow up with your Doctor in 1-2 days.    Follow up with Spine Specialist see attached list.  Heat to back.    Take Ibuprofen 400mg orally every 6 hours as needed for pain take with food.     Return to the ER for any persistent/worsening or new symptoms, weakness, numbness, difficulty urinating or any concerning symptoms.      Acute Back Pain, Adult      Acute back pain is sudden and usually short-lived. It is often caused by an injury to the muscles and tissues in the back. The injury may result from:  •A muscle, tendon, or ligament getting overstretched or torn. Ligaments are tissues that connect bones to each other. Lifting something improperly can cause a back strain.      •Wear and tear (degeneration) of the spinal disks. Spinal disks are circular tissue that provide cushioning between the bones of the spine (vertebrae).      •Twisting motions, such as while playing sports or doing yard work.      •A hit to the back.      •Arthritis.      You may have a physical exam, lab tests, and imaging tests to find the cause of your pain. Acute back pain usually goes away with rest and home care.      Follow these instructions at home:    Managing pain, stiffness, and swelling     •Take over-the-counter and prescription medicines only as told by your health care provider. Treatment may include medicines for pain and inflammation that are taken by mouth or applied to the skin, or muscle relaxants.    •Your health care provider may recommend applying ice during the first 24–48 hours after your pain starts. To do this:  •Put ice in a plastic bag.      •Place a towel between your skin and the bag.      •Leave the ice on for 20 minutes, 2–3 times a day.      •Remove the ice if your skin turns bright red. This is very important. If you cannot feel pain, heat, or cold, you have a greater risk of damage to the area.      •If directed, apply heat to the affected area as often as told by your health care provider. Use the heat source that your health care provider recommends, such as a moist heat pack or a heating pad.  •Place a towel between your skin and the heat source.      •Leave the heat on for 20–30 minutes.      •Remove the heat if your skin turns bright red. This is especially important if you are unable to feel pain, heat, or cold. You have a greater risk of getting burned.          Activity   Comparisons of good and bad posture while driving, standing, sitting at a desk, and lifting heavy objects.   • Do not stay in bed. Staying in bed for more than 1–2 days can delay your recovery.    •Sit up and stand up straight. Avoid leaning forward when you sit or hunching over when you stand.  •If you work at a desk, sit close to it so you do not need to lean over. Keep your chin tucked in. Keep your neck drawn back, and keep your elbows bent at a 90-degree angle (right angle).      •Sit high and close to the steering wheel when you drive. Add lower back (lumbar) support to your car seat, if needed.        •Take short walks on even surfaces as soon as you are able. Try to increase the length of time you walk each day.      • Do not sit, drive, or  one place for more than 30 minutes at a time. Sitting or standing for long periods of time can put stress on your back.      • Do not drive or use heavy machinery while taking prescription pain medicine.    •Use proper lifting techniques. When you bend and lift, use positions that put less stress on your back:  •Bend your knees.      •Keep the load close to your body.      •Avoid twisting.        •Exercise regularly as told by your health care provider. Exercising helps your back heal faster and helps prevent back injuries by keeping muscles strong and flexible.      •Work with a physical therapist to make a safe exercise program, as recommended by your health care provider. Do any exercises as told by your physical therapist.      Lifestyle     •Maintain a healthy weight. Extra weight puts stress on your back and makes it difficult to have good posture.      •Avoid activities or situations that make you feel anxious or stressed. Stress and anxiety increase muscle tension and can make back pain worse. Learn ways to manage anxiety and stress, such as through exercise.      General instructions     •Sleep on a firm mattress in a comfortable position. Try lying on your side with your knees slightly bent. If you lie on your back, put a pillow under your knees.    •Keep your head and neck in a straight line with your spine (neutral position) when using electronic equipment like smartphones or pads. To do this:  •Raise your smartphone or pad to look at it instead of bending your head or neck to look down.      •Put the smartphone or pad at the level of your face while looking at the screen.      •Follow your treatment plan as told by your health care provider. This may include:  •Cognitive or behavioral therapy.      •Acupuncture or massage therapy.      •Meditation or yoga.          Contact a health care provider if:    •You have pain that is not relieved with rest or medicine.      •You have increasing pain going down into your legs or buttocks.      •Your pain does not improve after 2 weeks.      •You have pain at night.      •You lose weight without trying.      •You have a fever or chills.      •You develop nausea or vomiting.      •You develop abdominal pain.        Get help right away if:    •You develop new bowel or bladder control problems.      •You have unusual weakness or numbness in your arms or legs.      •You feel faint.      These symptoms may represent a serious problem that is an emergency. Do not wait to see if the symptoms will go away. Get medical help right away. Call your local emergency services (911 in the [place] Do not drive yourself to the hospital.       Summary    •Acute back pain is sudden and usually short-lived.      •Use proper lifting techniques. When you bend and lift, use positions that put less stress on your back.      •Take over-the-counter and prescription medicines only as told by your health care provider, and apply heat or ice as told.      This information is not intended to replace advice given to you by your health care provider. Make sure you discuss any questions you have with your health care provider.      Document Revised: 03/11/2022 Document Reviewed: 03/11/2022    Elsevier Patient Education © 2022 [employer]

## 2022-12-29 NOTE — ED ADULT NURSE NOTE - NSIMPLEMENTINTERV_GEN_ALL_ED
Implemented All Universal Safety Interventions:  [place] to call system. Call bell, personal items and telephone within reach. Instruct patient to call for assistance. Room bathroom lighting operational. Non-slip footwear when patient is off stretcher. Physically safe environment: no spills, clutter or unnecessary equipment. Stretcher in lowest position, wheels locked, appropriate side rails in place.

## 2022-12-29 NOTE — ED PROVIDER NOTE - PHYSICAL EXAMINATION
Positive tenderness to palpation to right-sided paraspinal lumbar region no midline tenderness to palpation, positive right straight leg raise, neurovascularly intact 2+ pulses, strength 5 out of 5 all extremities, sensation equal and intact throughout, normal gait.

## 2022-12-29 NOTE — ED PROVIDER NOTE - OBJECTIVE STATEMENT
32-year-old female with a history of chronic back pain presents to the emergency department complaining of 1 week of right-sided low back pain.  Patient states pain is worse with movement bending and twisting.  Patient denies weakness, numbness, tingling, fecal/urinary incontinence, fevers, chills, saddle anesthesia, fall, trauma.

## 2022-12-29 NOTE — ED PROVIDER NOTE - PATIENT PORTAL LINK FT
You can access the FollowMyHealth Patient Portal offered by [facility] by registering at the following website: [email or web address]. By joining [employer]’s FollowMyHealth portal, you will also be able to view your health information using other applications (apps) compatible with our system.

## 2022-12-29 NOTE — ED PROVIDER NOTE - CLINICAL SUMMARY MEDICAL DECISION MAKING FREE TEXT BOX
32-year-old female with a history of chronic back pain presents to the emergency department complaining of 1 week of right-sided low back pain. Patient is well-appearing no acute distress, exam consistent with musculoskeletal back pain, neurovascularly intact, pain control, follow-up spine/PMD.

## 2022-12-29 NOTE — ED PROVIDER NOTE - NS ED ATTENDING STATEMENT MOD
This was a shared visit with the KEV. I reviewed and verified the documentation and independently performed the documented:
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
## 2022-12-29 NOTE — ED ADULT TRIAGE NOTE - AS TEMP SITE
Received lab results from [facility], copied and sent to medical records, gave a copy to triage.   oral

## 2023-12-13 ENCOUNTER — APPOINTMENT (OUTPATIENT)
Dept: OBGYN | Facility: CLINIC | Age: 33
End: 2023-12-13
Payer: COMMERCIAL

## 2023-12-13 VITALS
DIASTOLIC BLOOD PRESSURE: 90 MMHG | SYSTOLIC BLOOD PRESSURE: 138 MMHG | BODY MASS INDEX: 34.99 KG/M2 | HEIGHT: 65 IN | WEIGHT: 210 LBS

## 2023-12-13 DIAGNOSIS — Z01.411 ENCOUNTER FOR GYNECOLOGICAL EXAMINATION (GENERAL) (ROUTINE) WITH ABNORMAL FINDINGS: ICD-10-CM

## 2023-12-13 DIAGNOSIS — N93.9 ABNORMAL UTERINE AND VAGINAL BLEEDING, UNSPECIFIED: ICD-10-CM

## 2023-12-13 DIAGNOSIS — N97.9 FEMALE INFERTILITY, UNSPECIFIED: ICD-10-CM

## 2023-12-13 PROCEDURE — 36415 COLL VENOUS BLD VENIPUNCTURE: CPT

## 2023-12-13 PROCEDURE — 99385 PREV VISIT NEW AGE 18-39: CPT

## 2023-12-13 PROCEDURE — 81025 URINE PREGNANCY TEST: CPT

## 2023-12-13 NOTE — DISCUSSION/SUMMARY
[FreeTextEntry1] : - Pap/HPV obtained today - STI testing offered; done - Hormone panel sent for AUB - Pelvic Sono requisition given due to episodic pain - Discussed menstrual calendar w/pt - Explained fertile window and optimal timing of intercourse - Discussed taking PNV and use of ovulation predictors - DIAZ referral rendered; pt reports trying to conceive for over 1 year

## 2023-12-13 NOTE — HISTORY OF PRESENT ILLNESS
[No] : Patient does not have concerns regarding sex [Currently Active] : currently active [Men] : men [Vaginal] : vaginal [FreeTextEntry1] : 34yo  LMP 23 here for annual exam.  Pt reports irregular menses over the past few months as well as L sided pelvic pain that happens episodically.  17, male, LTCS, Cat II tracing, LEW (Amber), 2zce2il 38.6 SAB x 1 ()

## 2023-12-17 LAB
BASOPHILS # BLD AUTO: 0.02 K/UL
BASOPHILS NFR BLD AUTO: 0.3 %
C TRACH RRNA SPEC QL NAA+PROBE: NOT DETECTED
DHEA-S SERPL-MCNC: 286 UG/DL
EOSINOPHIL # BLD AUTO: 0.05 K/UL
EOSINOPHIL NFR BLD AUTO: 0.7 %
ESTRADIOL SERPL-MCNC: 86 PG/ML
FSH SERPL-MCNC: 4.4 IU/L
HBV SURFACE AG SER QL: NONREACTIVE
HCG UR QL: NEGATIVE
HCT VFR BLD CALC: 39.3 %
HCV AB SER QL: NONREACTIVE
HCV S/CO RATIO: 0.08 S/CO
HGB BLD-MCNC: 12 G/DL
HIV1+2 AB SPEC QL IA.RAPID: NONREACTIVE
HPV HIGH+LOW RISK DNA PNL CVX: NOT DETECTED
IMM GRANULOCYTES NFR BLD AUTO: 0.1 %
LH SERPL-ACNC: 10.7 IU/L
LYMPHOCYTES # BLD AUTO: 3.02 K/UL
LYMPHOCYTES NFR BLD AUTO: 44.3 %
MAN DIFF?: NORMAL
MCHC RBC-ENTMCNC: 26.5 PG
MCHC RBC-ENTMCNC: 30.5 GM/DL
MCV RBC AUTO: 86.9 FL
MONOCYTES # BLD AUTO: 0.36 K/UL
MONOCYTES NFR BLD AUTO: 5.3 %
N GONORRHOEA RRNA SPEC QL NAA+PROBE: NOT DETECTED
NEUTROPHILS # BLD AUTO: 3.36 K/UL
NEUTROPHILS NFR BLD AUTO: 49.3 %
PLATELET # BLD AUTO: 339 K/UL
PROGEST SERPL-MCNC: 2.4 NG/ML
PROLACTIN SERPL-MCNC: 12.1 NG/ML
QUALITY CONTROL: YES
RBC # BLD: 4.52 M/UL
RBC # FLD: 13.9 %
SOURCE AMPLIFICATION: NORMAL
SOURCE TP AMPLIFICATION: NORMAL
T PALLIDUM AB SER QL IA: NEGATIVE
T VAGINALIS RRNA SPEC QL NAA+PROBE: NOT DETECTED
T4 FREE SERPL-MCNC: 1.2 NG/DL
TESTOST SERPL-MCNC: 26.1 NG/DL
TSH SERPL-ACNC: 1.55 UIU/ML
WBC # FLD AUTO: 6.82 K/UL

## 2023-12-19 LAB — CYTOLOGY CVX/VAG DOC THIN PREP: NORMAL

## 2023-12-20 ENCOUNTER — NON-APPOINTMENT (OUTPATIENT)
Age: 33
End: 2023-12-20

## 2023-12-20 ENCOUNTER — TRANSCRIPTION ENCOUNTER (OUTPATIENT)
Age: 33
End: 2023-12-20

## 2024-01-09 ENCOUNTER — NON-APPOINTMENT (OUTPATIENT)
Age: 34
End: 2024-01-09

## 2024-01-11 ENCOUNTER — NON-APPOINTMENT (OUTPATIENT)
Age: 34
End: 2024-01-11

## 2024-10-01 NOTE — ED PROVIDER NOTE - ATTENDING CONTRIBUTION TO CARE
Refill Decision Note   Katherine Rivers  is requesting a refill authorization.  Brief Assessment and Rationale for Refill:  Approve     Medication Therapy Plan:         Comments:     Note composed:2:36 PM 10/01/2024             Appointments     Last Visit   9/27/2024 Jennifer Casillas MD   Next Visit   1/9/2025 Jennifer Casillas MD      
No care due was identified.  Health [facility] Embedded Care Due Messages. Reference number: [id number].   9/30/2024 9:03:11 PM CDT  
I have personally performed a face to face bedside history and physical examination of this patient. I have discussed the history, examination, review of systems, assessment and plan of management with the resident. I have reviewed the electronic medical record and amended it to reflect my history, review of systems, physical exam, assessment and plan.    Brief HPI:  29 F  12wks pregnant presents with 2 days lower abdominal cramping with vaginal bleeding.      Vitals:   Reviewed    Exam:    GEN:  Non-toxic appearing, non-distressed, speaking full sentences, non-diaphoretic, AAOx3  HEENT:  NCAT, neck supple, EOMI, PERRLA, sclera anicteric, no conjunctival pallor or injection, no stridor, normal voice, no tonsillar exudate, uvula midline  CV:  regular rhythm and rate, s1/s2 audible, no murmurs, rubs or gallops, peripheral pulses 2+ and symmetric  PULM:  non-labored respirations, lungs clear to auscultation bilaterally, no wheezes, crackles or rales  ABD:  non distended, ttp b/l lower quadrants, no rebound, no guarding, negative Guaman's sign, bowel sounds normal, no cvat  MSK:  no gross deformity, non-tender extremities and joints, range of motion grossly normal appearing, no extremity edema, extremities warm and well perfused   NEURO:  AAOx3, CN II-XII intact, motor 5/5 in upper and lower extremities bilaterally, sensation grossly intact in extremities and trunk, finger to nose testing wnl, no nystagmus, negative Romberg, no pronator drift, no gait deficit  SKIN:  warm, dry, no rash or vesicles   :  exam performed by Dr. Howard     A/P:  29 F  12wks pregnant presents with 2 days lower abdominal cramping with vaginal bleeding.  VSS.  Suspect spontaneous  given recent US with no FHR.  Will obtain labs, tvus.  Dispo pending.

## 2025-06-11 NOTE — PATIENT PROFILE OB - BABY A: ID BAND NUMBER, DELIVERY
-was supposed to take lasix but had stopped.   - -takes diltiazem at night. LGIB minimal, will continue home bp med. [id number]